# Patient Record
Sex: FEMALE | Race: WHITE | URBAN - METROPOLITAN AREA
[De-identification: names, ages, dates, MRNs, and addresses within clinical notes are randomized per-mention and may not be internally consistent; named-entity substitution may affect disease eponyms.]

---

## 2017-12-01 ENCOUNTER — APPOINTMENT (OUTPATIENT)
Dept: OTHER | Facility: HOSPITAL | Age: 22
End: 2017-12-01
Attending: EMERGENCY MEDICINE

## 2022-12-01 ENCOUNTER — HOSPITAL ENCOUNTER (OUTPATIENT)
Age: 27
Discharge: HOME | End: 2022-12-01
Payer: COMMERCIAL

## 2022-12-01 VITALS
OXYGEN SATURATION: 100 % | SYSTOLIC BLOOD PRESSURE: 115 MMHG | HEART RATE: 72 BPM | RESPIRATION RATE: 18 BRPM | DIASTOLIC BLOOD PRESSURE: 60 MMHG | TEMPERATURE: 97 F

## 2022-12-01 DIAGNOSIS — R30.0 DYSURIA: ICD-10-CM

## 2022-12-01 DIAGNOSIS — R39.9 URINARY TRACT INFECTION SYMPTOMS: Primary | ICD-10-CM

## 2022-12-01 LAB — B-HCG UR QL: NEGATIVE

## 2022-12-01 PROCEDURE — 87077 CULTURE AEROBIC IDENTIFY: CPT | Performed by: NURSE PRACTITIONER

## 2022-12-01 PROCEDURE — 87086 URINE CULTURE/COLONY COUNT: CPT | Performed by: NURSE PRACTITIONER

## 2022-12-01 PROCEDURE — 81025 URINE PREGNANCY TEST: CPT

## 2022-12-01 PROCEDURE — 99204 OFFICE O/P NEW MOD 45 MIN: CPT

## 2022-12-01 PROCEDURE — 87186 SC STD MICRODIL/AGAR DIL: CPT | Performed by: NURSE PRACTITIONER

## 2022-12-01 RX ORDER — FERROUS SULFATE 325(65) MG
TABLET ORAL
COMMUNITY

## 2022-12-01 RX ORDER — ERGOCALCIFEROL 1.25 MG/1
CAPSULE ORAL
COMMUNITY
Start: 2022-11-05

## 2022-12-01 RX ORDER — CEPHALEXIN 500 MG/1
500 CAPSULE ORAL 2 TIMES DAILY
Qty: 14 CAPSULE | Refills: 0 | Status: SHIPPED | OUTPATIENT
Start: 2022-12-01 | End: 2022-12-08

## 2022-12-01 RX ORDER — FLUCONAZOLE 150 MG/1
150 TABLET ORAL ONCE
Qty: 1 TABLET | Refills: 0 | Status: SHIPPED | OUTPATIENT
Start: 2022-12-01 | End: 2022-12-01

## 2022-12-01 NOTE — ED INITIAL ASSESSMENT (HPI)
Urinary symptoms on and off for 2 weeks, no fever, no flank or back pain, denies vaginal bleeding or discharge, denies concern for std

## 2022-12-01 NOTE — DISCHARGE INSTRUCTIONS
A urine culture is being sent out if it grows bacteria showing resistance to the antibiotic prescribed you will be notified antibiotic will be changed. If it does not grow any bacteria you will be instructed to stop the antibiotic. Take the one-time dose of Diflucan for possible yeast infection. Drink plenty of fluids urinate whenever you have the urge if you develop severe lower abdominal pain back pain fevers nausea or vomiting or any new or worsening symptoms go to the nearest emergency department.

## 2022-12-16 ENCOUNTER — APPOINTMENT (OUTPATIENT)
Dept: URBAN - METROPOLITAN AREA CLINIC 244 | Age: 27
Setting detail: DERMATOLOGY
End: 2022-12-19

## 2022-12-16 DIAGNOSIS — L81.4 OTHER MELANIN HYPERPIGMENTATION: ICD-10-CM

## 2022-12-16 DIAGNOSIS — L82.1 OTHER SEBORRHEIC KERATOSIS: ICD-10-CM

## 2022-12-16 DIAGNOSIS — D22 MELANOCYTIC NEVI: ICD-10-CM

## 2022-12-16 DIAGNOSIS — B36.0 PITYRIASIS VERSICOLOR: ICD-10-CM

## 2022-12-16 PROBLEM — D23.72 OTHER BENIGN NEOPLASM OF SKIN OF LEFT LOWER LIMB, INCLUDING HIP: Status: ACTIVE | Noted: 2022-12-16

## 2022-12-16 PROBLEM — D22.5 MELANOCYTIC NEVI OF TRUNK: Status: ACTIVE | Noted: 2022-12-16

## 2022-12-16 PROBLEM — D48.5 NEOPLASM OF UNCERTAIN BEHAVIOR OF SKIN: Status: ACTIVE | Noted: 2022-12-16

## 2022-12-16 PROCEDURE — 11102 TANGNTL BX SKIN SINGLE LES: CPT

## 2022-12-16 PROCEDURE — 99204 OFFICE O/P NEW MOD 45 MIN: CPT | Mod: 25

## 2022-12-16 PROCEDURE — OTHER COUNSELING: OTHER

## 2022-12-16 PROCEDURE — OTHER PRESCRIPTION: OTHER

## 2022-12-16 PROCEDURE — OTHER BIOPSY BY SHAVE METHOD: OTHER

## 2022-12-16 RX ORDER — KETOCONAZOLE 20 MG/G
CREAM TOPICAL BID
Qty: 60 | Refills: 6 | Status: ERX | COMMUNITY
Start: 2022-12-16

## 2022-12-16 ASSESSMENT — LOCATION DETAILED DESCRIPTION DERM
LOCATION DETAILED: RIGHT SUPERIOR MEDIAL UPPER BACK
LOCATION DETAILED: UPPER STERNUM
LOCATION DETAILED: LEFT MEDIAL UPPER BACK
LOCATION DETAILED: RIGHT RIB CAGE
LOCATION DETAILED: POSTERIOR MID-PARIETAL SCALP

## 2022-12-16 ASSESSMENT — LOCATION SIMPLE DESCRIPTION DERM
LOCATION SIMPLE: LEFT UPPER BACK
LOCATION SIMPLE: CHEST
LOCATION SIMPLE: POSTERIOR SCALP
LOCATION SIMPLE: ABDOMEN
LOCATION SIMPLE: RIGHT UPPER BACK

## 2022-12-16 ASSESSMENT — LOCATION ZONE DERM
LOCATION ZONE: SCALP
LOCATION ZONE: TRUNK

## 2022-12-16 NOTE — PROCEDURE: BIOPSY BY SHAVE METHOD
Anesthesia Volume In Cc (Will Not Render If 0): 0.5
Render In Bullet Format When Appropriate: No
Electrodesiccation And Curettage Text: The wound bed was treated with electrodesiccation and curettage after the biopsy was performed.
X Size Of Lesion In Cm: 0
Wound Care: Petrolatum
Was A Bandage Applied: Yes
Biopsy Type: H and E
Hemostasis: Aluminum Chloride
Depth Of Biopsy: dermis
Post-Care Instructions: I reviewed with the patient in detail post-care instructions. Patient is to keep the biopsy site dry overnight, and then apply Petrolatum twice daily until healed, or after a night or two leave area open and dry overnight and a scab will form which will fall off on its own in a few weeks.
Dressing: bandage
Notification Instructions: Patient will be notified of biopsy results. However, patient instructed to call the office if not contacted within 2 weeks.
Billing Type: Third-Party Bill
Silver Nitrate Text: The wound bed was treated with silver nitrate after the biopsy was performed.
Type Of Destruction Used: Curettage
Anesthesia Type: 1% lidocaine with epinephrine and a 1:10 solution of 8.4% sodium bicarbonate
Cryotherapy Text: The wound bed was treated with cryotherapy after the biopsy was performed.
Detail Level: Detailed
Information: Selecting Yes will display possible errors in your note based on the variables you have selected. This validation is only offered as a suggestion for you. PLEASE NOTE THAT THE VALIDATION TEXT WILL BE REMOVED WHEN YOU FINALIZE YOUR NOTE. IF YOU WANT TO FAX A PRELIMINARY NOTE YOU WILL NEED TO TOGGLE THIS TO 'NO' IF YOU DO NOT WANT IT IN YOUR FAXED NOTE.
Electrodesiccation Text: The wound bed was treated with electrodesiccation after the biopsy was performed.
Consent: Written consent was obtained and risks were reviewed including but not limited to scarring, infection, bleeding, scabbing, incomplete removal, nerve damage and allergy to anesthesia.
Biopsy Method: Dermablade
Curettage Text: The wound bed was treated with curettage after the biopsy was performed.

## 2025-04-21 NOTE — PROGRESS NOTES
Subjective:   Goldie Buck is a 30 year old female with PMH anxiety, ELENI, vit D deficiency who presents for Establish Care and Medication Request (Refills )     Medical problems include:  Anxiety - on lexapro x 3 years, happy with her current dose. Also sees a therapist weekly.  Previously had very heavy menses and was on iron pills which didn't help - so she was started nuvaring, which has alleviated this issue    R lateral wrist redness/dryness - comes and goes - has tried zinc diaper rash cream - unsure if it helped or not. Also aquaphor.  Sometimes is itchy.  Has seasonal allergies but no specific allergies, no asthma as a child.    Lives in [place]  Works as  in [place]  Exercise / diet: pretty active - bike riding, walks. Balanced diet.  Sleep: great  Etoh: maybe once every couple of months  Tobacco/Drug use: none    LMP: last approx 2 weeks ago  Last PAP: unsure, maybe a year ago. Hx abnl PAP? no  Mammogram? N/a  Contraception? nuvaring  Sexually active? No  STI concern? No    Family history DM, heart disease, cancer: Dad T2DM    Colonoscopy (if >age 45): n/a. No symptoms. Has irregular BMs - alternates between loose stool and constipation since she was younger. Went to a lot of physicians before and was told to try laxatives which don't necessarily help. Mom has the same thing. Gets bloated but no pain.    Dentist: has been awhile, maybe a few years  Eye doctor: regularly - saw last 3 months ago  Immunizations due?  TDAP (every 10 years): unknown last immunization - interested in getting this today      History/Other:    Chief Complaint Reviewed and Verified  Nursing Notes Reviewed and   Verified  Tobacco Reviewed  Allergies Reviewed  Medications Reviewed    Problem List Reviewed  Medical History Reviewed  Surgical History   Reviewed  OB Status Reviewed  Family History Reviewed  Social History   Reviewed         Tobacco:  She has never smoked tobacco.    Current  Medications[1]      Review of Systems:  Review of Systems  10 point review of systems otherwise negative with the exception of HPI and assessment and plan.    Objective:   /66   Pulse 73   Temp 97.7 °F (36.5 °C) (Temporal)   Ht 5' 10.2\" (1.783 m)   Wt 155 lb 11.2 oz (70.6 kg)   LMP 04/01/2025 (Approximate)   SpO2 99%   BMI 22.21 kg/m²  Estimated body mass index is 22.21 kg/m² as calculated from the following:    Height as of this encounter: 5' 10.2\" (1.783 m).    Weight as of this encounter: 155 lb 11.2 oz (70.6 kg).      Physical Exam  Vitals reviewed.   Constitutional:       Appearance: Normal appearance.   HENT:      Head: Normocephalic.      Right Ear: Tympanic membrane normal.      Left Ear: Tympanic membrane normal.      Nose: Nose normal.      Mouth/Throat:      Mouth: Mucous membranes are moist.      Pharynx: No posterior oropharyngeal erythema.   Eyes:      Extraocular Movements: Extraocular movements intact.      Conjunctiva/sclera: Conjunctivae normal.      Pupils: Pupils are equal, round, and reactive to light.   Neck:      Thyroid: No thyroid mass, thyromegaly or thyroid tenderness.   Cardiovascular:      Heart sounds: S1 normal and S2 normal. No murmur heard.  Pulmonary:      Effort: Pulmonary effort is normal.      Breath sounds: Normal breath sounds.   Abdominal:      General: Bowel sounds are normal.      Palpations: Abdomen is soft.      Tenderness: There is no abdominal tenderness.   Musculoskeletal:      Cervical back: Normal range of motion.      Right lower leg: No edema.      Left lower leg: No edema.   Lymphadenopathy:      Cervical: No cervical adenopathy.      Upper Body:      Right upper body: No supraclavicular adenopathy.      Left upper body: No supraclavicular adenopathy.   Skin:     General: Skin is warm and dry.      Capillary Refill: Capillary refill takes less than 2 seconds.      Comments: Scaly dry patch to R lateral wrist - showed a photo of when it was  erythematous/pruritic.   Neurological:      General: No focal deficit present.      Mental Status: She is alert and oriented to person, place, and time.   Psychiatric:         Mood and Affect: Mood and affect normal.         Speech: Speech normal.         Assessment & Plan:   1. Wellness examination (Primary)  -     Comp Metabolic Panel (14); Future; Expected date: 04/21/2025  -     CBC With Differential With Platelet; Future; Expected date: 04/21/2025  -     Lipid Panel; Future; Expected date: 04/21/2025  -     TSH W Reflex To Free T4; Future; Expected date: 04/21/2025  -     Vitamin D, 25-Hydroxy; Future; Expected date: 04/21/2025  - Annual screening labs  - Aim for 30 minutes of moderate to vigorous activity 4-5 times per week. Healthy diet - plenty of leafy green vegetables, lean protein, fruits, whole grains.  - Biannual dental exams and annual vision exam.  - Return in approximately 1 year for annual physical.  2. Encounter for vitamin deficiency screening  -     Vitamin D, 25-Hydroxy; Future; Expected date: 04/21/2025  3. Lipid screening  -     Lipid Panel; Future; Expected date: 04/21/2025  4. Screening for deficiency anemia  -     CBC With Differential With Platelet; Future; Expected date: 04/21/2025  5. Thyroid disorder screen  -     TSH W Reflex To Free T4; Future; Expected date: 04/21/2025  6. Vitamin D deficiency  -     Vitamin D, 25-Hydroxy; Future; Expected date: 04/21/2025  7. Encounter for surveillance of vaginal ring hormonal contraceptive device  -     Etonogestrel-Ethinyl Estradiol; Place 1 Ring vaginally every 30 (thirty) days for 13 doses.  Dispense: 1 Ring; Refill: 0  - Refilled. Can do PAP in 2-4 years (she will try to find exact date.  8. Anxiety  -     Escitalopram Oxalate; Take 1.5 tablets (15 mg total) by mouth in the morning.  Dispense: 135 tablet; Refill: 3  - Refilled. Continue seeing therapist.  9. Atopic dermatitis, unspecified type  -     Triamcinolone Acetonide; Apply 1 Application  topically 2 (two) times daily for 7 days.  Dispense: 15 g; Refill: 2  10. Need for Tdap vaccination  -     TETANUS, DIPHTHERIA TOXOIDS AND ACELLULAR PERTUSIS VACCINE (TDAP), >7 YEARS, IM USE        Return in 1 year (on 4/21/2026).    MUNIRA Coburn, 4/21/2025, 3:55 PM     This note was prepared using Dragon Medical voice recognition dictation software. As a result errors may occur. When identified, these errors have been corrected. While every attempt is made to correct errors during dictation discrepancies may still exist.         [1]   Current Outpatient Medications   Medication Sig Dispense Refill    escitalopram 10 MG Oral Tab Take 1.5 tablets (15 mg total) by mouth in the morning. 135 tablet 3    Etonogestrel-Ethinyl Estradiol 0.12-0.015 MG/24HR Vaginal Ring Place 1 Ring vaginally every 30 (thirty) days for 13 doses. 1 Ring 0    triamcinolone 0.025 % External Cream Apply 1 Application topically 2 (two) times daily for 7 days. 15 g 2